# Patient Record
Sex: MALE | Race: WHITE | NOT HISPANIC OR LATINO | Employment: FULL TIME | URBAN - METROPOLITAN AREA
[De-identification: names, ages, dates, MRNs, and addresses within clinical notes are randomized per-mention and may not be internally consistent; named-entity substitution may affect disease eponyms.]

---

## 2023-07-17 LAB — THYROTROPIN (MIU/L) IN SER/PLAS BY DETECTION LIMIT <= 0.05 MIU/L: 0.3 MIU/L (ref 0.44–3.98)

## 2023-08-17 LAB
ANION GAP IN SER/PLAS: 10 MMOL/L (ref 10–20)
CALCIUM (MG/DL) IN SER/PLAS: 8.9 MG/DL (ref 8.6–10.3)
CARBON DIOXIDE, TOTAL (MMOL/L) IN SER/PLAS: 27 MMOL/L (ref 21–32)
CHLORIDE (MMOL/L) IN SER/PLAS: 106 MMOL/L (ref 98–107)
CREATININE (MG/DL) IN SER/PLAS: 0.77 MG/DL (ref 0.5–1.3)
GFR MALE: >90 ML/MIN/1.73M2
GLUCOSE (MG/DL) IN SER/PLAS: 107 MG/DL (ref 74–99)
POTASSIUM (MMOL/L) IN SER/PLAS: 4.1 MMOL/L (ref 3.5–5.3)
SODIUM (MMOL/L) IN SER/PLAS: 139 MMOL/L (ref 136–145)
UREA NITROGEN (MG/DL) IN SER/PLAS: 24 MG/DL (ref 6–23)

## 2024-01-24 ENCOUNTER — LAB (OUTPATIENT)
Dept: LAB | Facility: LAB | Age: 59
End: 2024-01-24
Payer: COMMERCIAL

## 2024-01-24 DIAGNOSIS — E03.9 HYPOTHYROIDISM, UNSPECIFIED: Primary | ICD-10-CM

## 2024-01-24 LAB — TSH SERPL-ACNC: 0.2 MIU/L (ref 0.44–3.98)

## 2024-01-24 PROCEDURE — 84443 ASSAY THYROID STIM HORMONE: CPT

## 2024-01-24 PROCEDURE — 36415 COLL VENOUS BLD VENIPUNCTURE: CPT

## 2024-01-30 ENCOUNTER — OFFICE VISIT (OUTPATIENT)
Dept: ENDOCRINOLOGY | Facility: CLINIC | Age: 59
End: 2024-01-30
Payer: COMMERCIAL

## 2024-01-30 VITALS
SYSTOLIC BLOOD PRESSURE: 155 MMHG | HEART RATE: 79 BPM | DIASTOLIC BLOOD PRESSURE: 88 MMHG | WEIGHT: 232 LBS | BODY MASS INDEX: 33.29 KG/M2

## 2024-01-30 DIAGNOSIS — E03.9 HYPOTHYROIDISM, UNSPECIFIED TYPE: Primary | ICD-10-CM

## 2024-01-30 PROCEDURE — 99213 OFFICE O/P EST LOW 20 MIN: CPT | Performed by: INTERNAL MEDICINE

## 2024-01-30 RX ORDER — LEVOTHYROXINE SODIUM 125 UG/1
TABLET ORAL
COMMUNITY
End: 2024-01-30 | Stop reason: SDUPTHER

## 2024-01-30 RX ORDER — FAMOTIDINE 20 MG/1
20 TABLET, FILM COATED ORAL
COMMUNITY
Start: 2019-07-26

## 2024-01-30 RX ORDER — LEVOTHYROXINE SODIUM 112 UG/1
224 TABLET ORAL
Qty: 180 TABLET | Refills: 3 | Status: SHIPPED | OUTPATIENT
Start: 2024-01-30 | End: 2025-01-29

## 2024-01-30 NOTE — PROGRESS NOTES
History Of Present Illness  Gomez Aponte is a 58 y.o. male with a 29 year history of hypothyroidism    Levothyroxine 250 mcg/day  Patient is taking levothyroxine on an empty stomach with water alone.    No new complaints    History of Whipple procedure 2018  Follow up with Dr. Byrnes     Past Medical History  He has a past medical history of Cyst of pancreas, Impaired fasting glucose (06/10/2020), Impaired fasting glucose (12/10/2019), Personal history of other diseases of the circulatory system (04/16/2019), and Personal history of other specified conditions (04/16/2019).    Surgical History  He has a past surgical history that includes Other surgical history (04/16/2019); Other surgical history (10/27/2022); Other surgical history (07/26/2019); and Other surgical history (04/26/2019).     Social History  He reports that he has an unknown smoking status. He has never been exposed to tobacco smoke. He has never used smokeless tobacco. He reports that he does not drink alcohol and does not use drugs.    Family History  No family history on file.    Medications  Current Outpatient Medications   Medication Instructions    famotidine (PEPCID) 20 mg, oral, Daily RT    levothyroxine (Synthroid, Levoxyl) 125 mcg tablet TAKE TWO (2) TABLETS DAILY ON AN EMPTY STOMACH       Allergies  Patient has no known allergies.    Last Recorded Vitals  Blood pressure 155/88, pulse 79, weight 105 kg (232 lb).    Physical Exam  Constitutional:       General: He is not in acute distress.  Neurological:      Mental Status: He is alert.   Psychiatric:         Mood and Affect: Affect normal.          Relevant Results  Lab Results   Component Value Date    TSH 0.20 (L) 01/24/2024    FREET4 0.76 07/14/2022         IMPRESSION  HYPOTHYROIDISM  Further TSH suppression on LT4 at 250 mcg/day      RECOMMENDATIONS  Decrease Levothyroxine to 224 mcg once daily  Take levothyroxine on an empty stomach with water alone, 1 hour before eating or taking  other medications, 4 hours before any calcium or iron supplement.    Follow up 6 months  Repeat TSH before next appointment    I reminded him to reestablish primary care.

## 2024-01-30 NOTE — PATIENT INSTRUCTIONS
RECOMMENDATIONS  Decrease Levothyroxine to 224 mcg once daily  Take levothyroxine on an empty stomach with water alone, 1 hour before eating or taking other medications, 4 hours before any calcium or iron supplement.    Follow up 6 months  Repeat TSH before next appointment

## 2024-07-25 ENCOUNTER — LAB (OUTPATIENT)
Dept: LAB | Facility: LAB | Age: 59
End: 2024-07-25
Payer: COMMERCIAL

## 2024-07-25 DIAGNOSIS — E03.9 HYPOTHYROIDISM, UNSPECIFIED TYPE: ICD-10-CM

## 2024-07-25 LAB — TSH SERPL-ACNC: 0.73 MIU/L (ref 0.44–3.98)

## 2024-07-25 PROCEDURE — 36415 COLL VENOUS BLD VENIPUNCTURE: CPT

## 2024-07-25 PROCEDURE — 84443 ASSAY THYROID STIM HORMONE: CPT

## 2024-07-31 ENCOUNTER — APPOINTMENT (OUTPATIENT)
Dept: ENDOCRINOLOGY | Facility: CLINIC | Age: 59
End: 2024-07-31
Payer: COMMERCIAL

## 2024-07-31 VITALS
WEIGHT: 225 LBS | HEART RATE: 70 BPM | SYSTOLIC BLOOD PRESSURE: 132 MMHG | BODY MASS INDEX: 32.28 KG/M2 | DIASTOLIC BLOOD PRESSURE: 83 MMHG

## 2024-07-31 DIAGNOSIS — E03.9 HYPOTHYROIDISM, UNSPECIFIED TYPE: ICD-10-CM

## 2024-07-31 PROCEDURE — 99213 OFFICE O/P EST LOW 20 MIN: CPT | Performed by: INTERNAL MEDICINE

## 2024-07-31 RX ORDER — LEVOTHYROXINE SODIUM 112 UG/1
224 TABLET ORAL
Qty: 180 TABLET | Refills: 3 | Status: SHIPPED | OUTPATIENT
Start: 2024-07-31 | End: 2025-07-31

## 2024-07-31 ASSESSMENT — ENCOUNTER SYMPTOMS
PALPITATIONS: 0
TREMORS: 0
NAUSEA: 0
NECK PAIN: 0
WEAKNESS: 0
HEADACHES: 0
NERVOUS/ANXIOUS: 0
SHORTNESS OF BREATH: 0
UNEXPECTED WEIGHT CHANGE: 0
FEVER: 0
TROUBLE SWALLOWING: 0
CONSTIPATION: 0
ABDOMINAL PAIN: 0
VOMITING: 0
DIARRHEA: 0
FATIGUE: 0

## 2024-07-31 NOTE — PROGRESS NOTES
History Of Present Illness  Gomez Aponte is a 58 y.o. male with a 29 year history of hypothyroidism    Levothyroxine decreased 6 months ago from 250 to 224 mcg/day  Patient is taking levothyroxine on an empty stomach with water alone.    Past Medical History  He has a past medical history of Cyst of pancreas ([facility]), Impaired fasting glucose (06/10/2020), Impaired fasting glucose (12/10/2019), Personal history of other diseases of the circulatory system (04/16/2019), and Personal history of other specified conditions (04/16/2019).    Surgical History  He has a past surgical history that includes Other surgical history (04/16/2019); Other surgical history (10/27/2022); Other surgical history (07/26/2019); and Other surgical history (04/26/2019).     Social History  He reports that he has an unknown smoking status. He has never been exposed to tobacco smoke. He has never used smokeless tobacco. He reports that he does not drink alcohol and does not use drugs.    Family History  No family history on file.    Medications  Current Outpatient Medications   Medication Instructions    famotidine (PEPCID) 20 mg, oral, Daily RT    levothyroxine (SYNTHROID, LEVOXYL) 224 mcg, oral, Daily before breakfast       Allergies  Patient has no known allergies.    Review of Systems   Constitutional:  Negative for fatigue, fever and unexpected weight change.   HENT:  Negative for trouble swallowing.    Eyes:  Negative for visual disturbance.        Dry eye   Respiratory:  Negative for shortness of breath.    Cardiovascular:  Negative for chest pain and palpitations.   Gastrointestinal:  Negative for abdominal pain, constipation, diarrhea, nausea and vomiting.   Endocrine: Negative for cold intolerance and heat intolerance.   Musculoskeletal:  Negative for neck pain.   Skin:  Negative for rash.   Neurological:  Negative for tremors, weakness and headaches.   Psychiatric/Behavioral:  The patient is not nervous/anxious.          Last  Recorded Vitals  Blood pressure 132/83, pulse 70, weight 102 kg (225 lb).    Physical Exam  Constitutional:       General: He is not in acute distress.  Neurological:      Mental Status: He is alert.   Psychiatric:         Mood and Affect: Affect normal.          Relevant Results  Lab Results   Component Value Date    TSH 0.73 07/25/2024    FREET4 0.76 07/14/2022         IMPRESSION  HYPOTHYROIDISM  TSH euthyroid on decreased Levothyroxine dose      RECOMMENDATIONS  Continue levothyroxine 224 mcg/day  Take levothyroxine on an empty stomach with water alone, 1 hour before eating or taking other medications, 4 hours before any calcium or iron supplement.    Follow up 1 year  Repeat TSH before next appointment

## 2024-07-31 NOTE — PATIENT INSTRUCTIONS
RECOMMENDATIONS  Continue levothyroxine 224 mcg/day  Take levothyroxine on an empty stomach with water alone, 1 hour before eating or taking other medications, 4 hours before any calcium or iron supplement.    Follow up 1 year  Repeat TSH before next appointment

## 2024-08-19 DIAGNOSIS — D49.0 IPMN (INTRADUCTAL PAPILLARY MUCINOUS NEOPLASM): Primary | ICD-10-CM

## 2024-08-21 ENCOUNTER — APPOINTMENT (OUTPATIENT)
Dept: PRIMARY CARE | Facility: CLINIC | Age: 59
End: 2024-08-21
Payer: COMMERCIAL

## 2024-08-21 VITALS
HEIGHT: 70 IN | WEIGHT: 223 LBS | BODY MASS INDEX: 31.92 KG/M2 | SYSTOLIC BLOOD PRESSURE: 120 MMHG | HEART RATE: 72 BPM | DIASTOLIC BLOOD PRESSURE: 80 MMHG

## 2024-08-21 DIAGNOSIS — E11.9 DIET-CONTROLLED DIABETES MELLITUS (MULTI): ICD-10-CM

## 2024-08-21 DIAGNOSIS — Z12.11 COLON CANCER SCREENING: Primary | ICD-10-CM

## 2024-08-21 DIAGNOSIS — D49.0 IPMN (INTRADUCTAL PAPILLARY MUCINOUS NEOPLASM): ICD-10-CM

## 2024-08-21 DIAGNOSIS — Z12.5 PROSTATE CANCER SCREENING: ICD-10-CM

## 2024-08-21 DIAGNOSIS — E03.9 ACQUIRED HYPOTHYROIDISM: ICD-10-CM

## 2024-08-21 DIAGNOSIS — K21.00 GASTROESOPHAGEAL REFLUX DISEASE WITH ESOPHAGITIS WITHOUT HEMORRHAGE: ICD-10-CM

## 2024-08-21 DIAGNOSIS — Z76.89 ENCOUNTER TO ESTABLISH CARE: ICD-10-CM

## 2024-08-21 PROBLEM — K21.9 GERD (GASTROESOPHAGEAL REFLUX DISEASE): Status: ACTIVE | Noted: 2023-08-23

## 2024-08-21 PROCEDURE — 3079F DIAST BP 80-89 MM HG: CPT | Performed by: CLINICAL NURSE SPECIALIST

## 2024-08-21 PROCEDURE — 99214 OFFICE O/P EST MOD 30 MIN: CPT | Performed by: CLINICAL NURSE SPECIALIST

## 2024-08-21 PROCEDURE — 3008F BODY MASS INDEX DOCD: CPT | Performed by: CLINICAL NURSE SPECIALIST

## 2024-08-21 PROCEDURE — 3074F SYST BP LT 130 MM HG: CPT | Performed by: CLINICAL NURSE SPECIALIST

## 2024-08-21 RX ORDER — FAMOTIDINE 20 MG/1
20 TABLET, FILM COATED ORAL DAILY
Qty: 90 TABLET | Refills: 3 | Status: SHIPPED | OUTPATIENT
Start: 2024-08-21 | End: 2025-08-16

## 2024-08-21 ASSESSMENT — COLUMBIA-SUICIDE SEVERITY RATING SCALE - C-SSRS
1. IN THE PAST MONTH, HAVE YOU WISHED YOU WERE DEAD OR WISHED YOU COULD GO TO SLEEP AND NOT WAKE UP?: NO
2. HAVE YOU ACTUALLY HAD ANY THOUGHTS OF KILLING YOURSELF?: NO
6. HAVE YOU EVER DONE ANYTHING, STARTED TO DO ANYTHING, OR PREPARED TO DO ANYTHING TO END YOUR LIFE?: NO

## 2024-08-21 ASSESSMENT — ENCOUNTER SYMPTOMS
SORE THROAT: 0
OCCASIONAL FEELINGS OF UNSTEADINESS: 0
SEIZURES: 0
MYALGIAS: 0
ACTIVITY CHANGE: 0
SHORTNESS OF BREATH: 0
BLOOD IN STOOL: 0
ABDOMINAL PAIN: 0
WHEEZING: 0
DYSURIA: 0
APPETITE CHANGE: 0
BRUISES/BLEEDS EASILY: 0
UNEXPECTED WEIGHT CHANGE: 0
CHEST TIGHTNESS: 0
HEMATURIA: 0
PALPITATIONS: 0
DEPRESSION: 0
CHILLS: 0
HEADACHES: 0
EYE PAIN: 0
TROUBLE SWALLOWING: 0
JOINT SWELLING: 0
WOUND: 0
FLANK PAIN: 0
SLEEP DISTURBANCE: 0
FATIGUE: 0
VOMITING: 0
POLYDIPSIA: 0
DIZZINESS: 0
BACK PAIN: 0
DIARRHEA: 0
PHOTOPHOBIA: 0
ARTHRALGIAS: 0
COUGH: 0
NAUSEA: 0
CONFUSION: 0
FEVER: 0
LOSS OF SENSATION IN FEET: 0
NECK PAIN: 0
CONSTIPATION: 0

## 2024-08-21 ASSESSMENT — PATIENT HEALTH QUESTIONNAIRE - PHQ9
SUM OF ALL RESPONSES TO PHQ9 QUESTIONS 1 AND 2: 0
1. LITTLE INTEREST OR PLEASURE IN DOING THINGS: NOT AT ALL
2. FEELING DOWN, DEPRESSED OR HOPELESS: NOT AT ALL

## 2024-08-21 NOTE — PROGRESS NOTES
Subjective   Patient ID: Gomez Aponte is a 59 y.o. male who presents for Establish Care (Transfer from Dr. Alvarez/Discuss varicose veins ).  HPI    New patient here today to establish care.  Transfer care from Dr. Alvarez. Last visit with Dr. Alvarez July 2022.     Follows with Dr. Sewell for Endo for Hypothyroidism. Annually.     Follows with Dr. Byrnes. 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 Imaging to be done in September.     Up to date with [facility] and Dental exams.    Review of Systems   Constitutional:  Negative for activity change, appetite change, chills, fatigue, fever and unexpected weight change.   HENT:  Negative for ear pain, hearing loss, nosebleeds, sore throat, tinnitus and trouble swallowing.    Eyes:  Negative for photophobia, pain and visual disturbance.   Respiratory:  Negative for cough, chest tightness, shortness of breath and wheezing.    Cardiovascular:  Negative for chest pain, palpitations and leg swelling.   Gastrointestinal:  Negative for abdominal pain, blood in stool, constipation, diarrhea, nausea and vomiting.   Endocrine: Negative for cold intolerance, heat intolerance, polydipsia and polyuria.   Genitourinary:  Negative for dysuria, flank pain and hematuria.   Musculoskeletal:  Negative for arthralgias, back pain, joint swelling, myalgias and neck pain.   Skin:  Negative for pallor, rash and wound.   Allergic/Immunologic: Negative for immunocompromised state.   Neurological:  Negative for dizziness, seizures and headaches.   Hematological:  Does not bruise/bleed easily.   Psychiatric/Behavioral:  Negative for confusion and sleep disturbance.        Objective   Physical Exam  Vitals and nursing note reviewed.   Constitutional:       General: He is not in acute distress.     Appearance: Normal appearance.   HENT:      Head: Normocephalic.      Nose: Nose normal.   Eyes:      Conjunctiva/sclera: Conjunctivae normal.   Neck:      Vascular: No carotid  bruit.   Cardiovascular:      Rate and Rhythm: Normal rate and regular rhythm.      Pulses: Normal pulses.      Heart sounds: Normal heart sounds.   Pulmonary:      Effort: Pulmonary effort is normal.      Breath sounds: Normal breath sounds.   Abdominal:      General: Bowel sounds are normal.      Palpations: Abdomen is soft.   Musculoskeletal:         General: Normal range of motion.      Cervical back: Normal range of motion.   Skin:     General: Skin is warm and dry.   Neurological:      Mental Status: He is alert and oriented to person, place, and time. Mental status is at baseline.   Psychiatric:         Mood and Affect: Mood normal.         Behavior: Behavior normal.       Assessment/Plan       New order for lab work provided at  today.     Diabetes: Most recent A1C 6.4%. Diet controlled. Albumin ordered. Up to date with vision exams. Normally well controlled readings at home, around 118 per patient. CT Cardiac Scoring ordered.   GERD: Famotidine as prescribed.   Hypothyroidism: Levothyroxine as prescribed. Continue to follow with Endo as previously determined.   IPMN: Following with Dr. Byrnes as previously determined.     COVID Vaccinations: June 2021.   Declined updated Vaccinations.   Colon Cancer Screening: Cologuard ordered.   Prostate Cancer Screening: Ordered.       ABIMAEL Martin-CNS 08/21/24 2:11 PM

## 2024-08-24 ENCOUNTER — LAB (OUTPATIENT)
Dept: LAB | Facility: LAB | Age: 59
End: 2024-08-24
Payer: COMMERCIAL

## 2024-08-24 DIAGNOSIS — K21.00 GASTROESOPHAGEAL REFLUX DISEASE WITH ESOPHAGITIS WITHOUT HEMORRHAGE: ICD-10-CM

## 2024-08-24 DIAGNOSIS — E11.9 DIET-CONTROLLED DIABETES MELLITUS (MULTI): ICD-10-CM

## 2024-08-24 DIAGNOSIS — Z12.11 COLON CANCER SCREENING: ICD-10-CM

## 2024-08-24 DIAGNOSIS — Z12.5 PROSTATE CANCER SCREENING: ICD-10-CM

## 2024-08-24 LAB
ALBUMIN SERPL BCP-MCNC: 4.3 G/DL (ref 3.4–5)
ALP SERPL-CCNC: 71 U/L (ref 33–120)
ALT SERPL W P-5'-P-CCNC: 17 U/L (ref 10–52)
ANION GAP SERPL CALC-SCNC: 11 MMOL/L (ref 10–20)
AST SERPL W P-5'-P-CCNC: 19 U/L (ref 9–39)
BILIRUB SERPL-MCNC: 0.9 MG/DL (ref 0–1.2)
BUN SERPL-MCNC: 18 MG/DL (ref 6–23)
CALCIUM SERPL-MCNC: 9 MG/DL (ref 8.6–10.3)
CHLORIDE SERPL-SCNC: 106 MMOL/L (ref 98–107)
CHOLEST SERPL-MCNC: 190 MG/DL (ref 0–199)
CHOLESTEROL/HDL RATIO: 4.3
CO2 SERPL-SCNC: 26 MMOL/L (ref 21–32)
CREAT SERPL-MCNC: 1.01 MG/DL (ref 0.5–1.3)
EGFRCR SERPLBLD CKD-EPI 2021: 86 ML/MIN/1.73M*2
ERYTHROCYTE [DISTWIDTH] IN BLOOD BY AUTOMATED COUNT: 16.2 % (ref 11.5–14.5)
EST. AVERAGE GLUCOSE BLD GHB EST-MCNC: 143 MG/DL
GLUCOSE SERPL-MCNC: 116 MG/DL (ref 74–99)
HBA1C MFR BLD: 6.6 %
HCT VFR BLD AUTO: 41.6 % (ref 41–52)
HCV AB SER QL: NONREACTIVE
HDLC SERPL-MCNC: 44.4 MG/DL
HGB BLD-MCNC: 12.8 G/DL (ref 13.5–17.5)
LDLC SERPL CALC-MCNC: 134 MG/DL
MCH RBC QN AUTO: 23.7 PG (ref 26–34)
MCHC RBC AUTO-ENTMCNC: 30.8 G/DL (ref 32–36)
MCV RBC AUTO: 77 FL (ref 80–100)
NON HDL CHOLESTEROL: 146 MG/DL (ref 0–149)
NRBC BLD-RTO: 0 /100 WBCS (ref 0–0)
PLATELET # BLD AUTO: 150 X10*3/UL (ref 150–450)
POTASSIUM SERPL-SCNC: 4.2 MMOL/L (ref 3.5–5.3)
PROT SERPL-MCNC: 6.7 G/DL (ref 6.4–8.2)
PSA SERPL-MCNC: 0.96 NG/ML
RBC # BLD AUTO: 5.41 X10*6/UL (ref 4.5–5.9)
SODIUM SERPL-SCNC: 139 MMOL/L (ref 136–145)
TRIGL SERPL-MCNC: 58 MG/DL (ref 0–149)
VIT B12 SERPL-MCNC: 408 PG/ML (ref 211–911)
VLDL: 12 MG/DL (ref 0–40)
WBC # BLD AUTO: 4.4 X10*3/UL (ref 4.4–11.3)

## 2024-08-24 PROCEDURE — 84153 ASSAY OF PSA TOTAL: CPT

## 2024-08-24 PROCEDURE — 82607 VITAMIN B-12: CPT

## 2024-08-24 PROCEDURE — 80053 COMPREHEN METABOLIC PANEL: CPT

## 2024-08-24 PROCEDURE — 85027 COMPLETE CBC AUTOMATED: CPT

## 2024-08-24 PROCEDURE — 80061 LIPID PANEL: CPT

## 2024-08-24 PROCEDURE — 36415 COLL VENOUS BLD VENIPUNCTURE: CPT

## 2024-08-24 PROCEDURE — 86803 HEPATITIS C AB TEST: CPT

## 2024-08-24 PROCEDURE — 83036 HEMOGLOBIN GLYCOSYLATED A1C: CPT

## 2024-08-26 ENCOUNTER — TELEPHONE (OUTPATIENT)
Dept: PRIMARY CARE | Facility: CLINIC | Age: 59
End: 2024-08-26
Payer: COMMERCIAL

## 2024-08-26 DIAGNOSIS — Z12.5 PROSTATE CANCER SCREENING: Primary | ICD-10-CM

## 2024-08-26 DIAGNOSIS — Z00.00 HEALTHCARE MAINTENANCE: ICD-10-CM

## 2024-08-26 NOTE — TELEPHONE ENCOUNTER
----- Message from Emy Bellamy sent at 8/26/2024  7:30 AM EDT -----  Updated patient via My Chart of results. Please reorder lab work for patient to have done prior to follow up appointment. Thank you!

## 2024-08-27 ENCOUNTER — LAB (OUTPATIENT)
Dept: LAB | Facility: LAB | Age: 59
End: 2024-08-27
Payer: COMMERCIAL

## 2024-08-27 DIAGNOSIS — K21.00 GASTROESOPHAGEAL REFLUX DISEASE WITH ESOPHAGITIS WITHOUT HEMORRHAGE: ICD-10-CM

## 2024-08-27 DIAGNOSIS — Z12.11 COLON CANCER SCREENING: ICD-10-CM

## 2024-08-27 DIAGNOSIS — E11.9 DIET-CONTROLLED DIABETES MELLITUS (MULTI): ICD-10-CM

## 2024-08-27 LAB
CREAT UR-MCNC: 108.3 MG/DL (ref 20–370)
MICROALBUMIN UR-MCNC: <7 MG/L
MICROALBUMIN/CREAT UR: NORMAL MG/G{CREAT}

## 2024-08-27 PROCEDURE — 82570 ASSAY OF URINE CREATININE: CPT

## 2024-08-27 PROCEDURE — 82043 UR ALBUMIN QUANTITATIVE: CPT

## 2024-09-04 ENCOUNTER — APPOINTMENT (OUTPATIENT)
Dept: RADIOLOGY | Facility: HOSPITAL | Age: 59
End: 2024-09-04
Payer: COMMERCIAL

## 2024-09-04 DIAGNOSIS — R19.5 POSITIVE COLORECTAL CANCER SCREENING USING COLOGUARD TEST: Primary | ICD-10-CM

## 2024-09-04 LAB — NONINV COLON CA DNA+OCC BLD SCRN STL QL: POSITIVE

## 2024-09-06 ENCOUNTER — APPOINTMENT (OUTPATIENT)
Dept: RADIOLOGY | Facility: HOSPITAL | Age: 59
End: 2024-09-06
Payer: COMMERCIAL

## 2024-09-10 ENCOUNTER — TELEPHONE (OUTPATIENT)
Dept: GASTROENTEROLOGY | Facility: CLINIC | Age: 59
End: 2024-09-10
Payer: COMMERCIAL

## 2024-09-10 NOTE — TELEPHONE ENCOUNTER
----- Message from Marco HARPER sent at 9/10/2024  1:31 PM EDT -----  Regarding: RE: Open access  Patient scheduled with Dr. Mir for 9/30/24  ----- Message -----  From: Kaleigh Go MA  Sent: 9/10/2024  10:20 AM EDT  To: [id number]  Subject: RE: Open access                                  Left message on machine to return call  ----- Message -----  From: Montserrat Rodriguez RN  Sent: 9/6/2024   1:33 PM EDT  To: [id number]  Subject: Open access                                      Open access. OK to OA per Randy

## 2024-09-13 ENCOUNTER — APPOINTMENT (OUTPATIENT)
Dept: SURGERY | Facility: CLINIC | Age: 59
End: 2024-09-13
Payer: COMMERCIAL

## 2024-09-20 ENCOUNTER — HOSPITAL ENCOUNTER (OUTPATIENT)
Dept: RADIOLOGY | Facility: HOSPITAL | Age: 59
Discharge: HOME | End: 2024-09-20
Payer: COMMERCIAL

## 2024-09-20 DIAGNOSIS — D49.0 IPMN (INTRADUCTAL PAPILLARY MUCINOUS NEOPLASM): ICD-10-CM

## 2024-09-20 PROCEDURE — 2550000001 HC RX 255 CONTRASTS: Performed by: SURGERY

## 2024-09-20 PROCEDURE — A9575 INJ GADOTERATE MEGLUMI 0.1ML: HCPCS | Performed by: SURGERY

## 2024-09-20 PROCEDURE — 74183 MRI ABD W/O CNTR FLWD CNTR: CPT

## 2024-09-20 RX ORDER — GADOTERATE MEGLUMINE 376.9 MG/ML
0.2 INJECTION INTRAVENOUS
Status: COMPLETED | OUTPATIENT
Start: 2024-09-20 | End: 2024-09-20

## 2024-09-24 NOTE — PROGRESS NOTES
"Reason for visit:  AUV / Review Imaging  DRAGON DOWN NOTES ABRIDGED    HPI:  Recall that in May 2019 I did a Whipple for a main duct IPMN with moderate dysplasia. He has been undergoing annual surveillance since.   Summary from visit last yeart:    \"This gentleman is doing extraordinarily well 4 years out from his Whipple for main duct IPMN with moderate grade dysplasia. Given his stable MRI we plan another MRI in a years time. If that is stable we will transition to every other year. He will call my office in a years time and my team will set up his MRI and office visit.     -----    MR Sept 2024:  FINDINGS:  Pancreas: Post Whipple status. The remnant pancreas appears mildly  atrophic and grossly unchanged when compared to 08/23/2023. The main  duct measures up to 5 mm in the body (coronal T2 series 3, image 22),  not significantly changed. There is mild prominence of a few side  branches. No new or progressive duct dilation. No discrete  obstructing mass.  IMPRESSION:  Stable appearance of remnant pancreas with similar degree of main  duct dilation (5 mm) when compared to 08/23/2023.    STABLE 5MM DUCT TO 2022    NO NEW HEALTH ISSUES    PE:  LOOKS GREAT NO HERNIA    Impression / Plan:    STABLE MRI 5 YEARS POST WHPPLE FOR NONIVASIVE IPMN.  STABLE 5MM PD.     WILL TRANSISITION TO EVERY OTHER YEAR MRI.  PT WILL LMK IF ANY IMAGING DONE FOR ANY OTHER REASON.  ALSO WILL LET ME KNOW IF ANY OTHER CONCERNING ISSUES LIKE WT LOSS, DM, ETC    "

## 2024-09-27 ENCOUNTER — PREP FOR PROCEDURE (OUTPATIENT)
Dept: GASTROENTEROLOGY | Facility: CLINIC | Age: 59
End: 2024-09-27

## 2024-09-27 ENCOUNTER — APPOINTMENT (OUTPATIENT)
Dept: SURGERY | Facility: CLINIC | Age: 59
End: 2024-09-27
Payer: COMMERCIAL

## 2024-09-27 VITALS
WEIGHT: 223 LBS | SYSTOLIC BLOOD PRESSURE: 146 MMHG | DIASTOLIC BLOOD PRESSURE: 90 MMHG | OXYGEN SATURATION: 100 % | HEART RATE: 64 BPM | BODY MASS INDEX: 31.92 KG/M2 | TEMPERATURE: 97.3 F | HEIGHT: 70 IN

## 2024-09-27 DIAGNOSIS — D49.0 IPMN (INTRADUCTAL PAPILLARY MUCINOUS NEOPLASM): Primary | ICD-10-CM

## 2024-09-27 PROCEDURE — 99212 OFFICE O/P EST SF 10 MIN: CPT | Performed by: SURGERY

## 2024-09-27 PROCEDURE — 3008F BODY MASS INDEX DOCD: CPT | Performed by: SURGERY

## 2024-09-27 RX ORDER — SODIUM CHLORIDE 9 MG/ML
20 INJECTION, SOLUTION INTRAVENOUS CONTINUOUS
Status: CANCELLED | OUTPATIENT
Start: 2024-09-27

## 2024-09-30 ENCOUNTER — ANESTHESIA (OUTPATIENT)
Dept: GASTROENTEROLOGY | Facility: HOSPITAL | Age: 59
End: 2024-09-30
Payer: COMMERCIAL

## 2024-09-30 ENCOUNTER — HOSPITAL ENCOUNTER (OUTPATIENT)
Dept: GASTROENTEROLOGY | Facility: HOSPITAL | Age: 59
Discharge: HOME | End: 2024-09-30
Payer: COMMERCIAL

## 2024-09-30 ENCOUNTER — ANESTHESIA EVENT (OUTPATIENT)
Dept: GASTROENTEROLOGY | Facility: HOSPITAL | Age: 59
End: 2024-09-30
Payer: COMMERCIAL

## 2024-09-30 VITALS
BODY MASS INDEX: 31.92 KG/M2 | WEIGHT: 223 LBS | SYSTOLIC BLOOD PRESSURE: 127 MMHG | TEMPERATURE: 97.5 F | RESPIRATION RATE: 19 BRPM | OXYGEN SATURATION: 97 % | DIASTOLIC BLOOD PRESSURE: 81 MMHG | HEIGHT: 70 IN | HEART RATE: 60 BPM

## 2024-09-30 DIAGNOSIS — R19.5 POSITIVE COLORECTAL CANCER SCREENING USING COLOGUARD TEST: ICD-10-CM

## 2024-09-30 PROCEDURE — 2500000005 HC RX 250 GENERAL PHARMACY W/O HCPCS: Performed by: NURSE ANESTHETIST, CERTIFIED REGISTERED

## 2024-09-30 PROCEDURE — 3700000001 HC GENERAL ANESTHESIA TIME - INITIAL BASE CHARGE

## 2024-09-30 PROCEDURE — 45380 COLONOSCOPY AND BIOPSY: CPT | Performed by: INTERNAL MEDICINE

## 2024-09-30 PROCEDURE — 2500000004 HC RX 250 GENERAL PHARMACY W/ HCPCS (ALT 636 FOR OP/ED): Performed by: NURSE ANESTHETIST, CERTIFIED REGISTERED

## 2024-09-30 PROCEDURE — 3700000002 HC GENERAL ANESTHESIA TIME - EACH INCREMENTAL 1 MINUTE

## 2024-09-30 PROCEDURE — 7100000010 HC PHASE TWO TIME - EACH INCREMENTAL 1 MINUTE

## 2024-09-30 PROCEDURE — 2500000004 HC RX 250 GENERAL PHARMACY W/ HCPCS (ALT 636 FOR OP/ED): Performed by: INTERNAL MEDICINE

## 2024-09-30 PROCEDURE — 7100000009 HC PHASE TWO TIME - INITIAL BASE CHARGE

## 2024-09-30 RX ORDER — SODIUM CHLORIDE 9 MG/ML
20 INJECTION, SOLUTION INTRAVENOUS CONTINUOUS
Status: DISCONTINUED | OUTPATIENT
Start: 2024-09-30 | End: 2024-10-01 | Stop reason: HOSPADM

## 2024-09-30 RX ORDER — LIDOCAINE HCL/PF 100 MG/5ML
SYRINGE (ML) INTRAVENOUS AS NEEDED
Status: DISCONTINUED | OUTPATIENT
Start: 2024-09-30 | End: 2024-09-30

## 2024-09-30 RX ORDER — PROPOFOL 10 MG/ML
INJECTION, EMULSION INTRAVENOUS AS NEEDED
Status: DISCONTINUED | OUTPATIENT
Start: 2024-09-30 | End: 2024-09-30

## 2024-09-30 SDOH — HEALTH STABILITY: MENTAL HEALTH: CURRENT SMOKER: 0

## 2024-09-30 ASSESSMENT — PAIN SCALES - GENERAL
PAINLEVEL_OUTOF10: 0 - NO PAIN

## 2024-09-30 ASSESSMENT — COLUMBIA-SUICIDE SEVERITY RATING SCALE - C-SSRS
6. HAVE YOU EVER DONE ANYTHING, STARTED TO DO ANYTHING, OR PREPARED TO DO ANYTHING TO END YOUR LIFE?: NO
1. IN THE PAST MONTH, HAVE YOU WISHED YOU WERE DEAD OR WISHED YOU COULD GO TO SLEEP AND NOT WAKE UP?: NO
2. HAVE YOU ACTUALLY HAD ANY THOUGHTS OF KILLING YOURSELF?: NO

## 2024-09-30 ASSESSMENT — PAIN - FUNCTIONAL ASSESSMENT
PAIN_FUNCTIONAL_ASSESSMENT: 0-10

## 2024-09-30 NOTE — ANESTHESIA POSTPROCEDURE EVALUATION
Patient: Gomez Aponte    Procedure Summary       Date: 09/30/24 Room / Location: [facility]    Anesthesia Start: 1245 Anesthesia Stop: 1312    Procedure: COLONOSCOPY Diagnosis: Positive colorectal cancer screening using Cologuard test    Scheduled Providers: Dave Mir MD Responsible Provider: MICHELE Rivera    Anesthesia Type: MAC ASA Status: 2            Anesthesia Type: MAC    Vitals Value Taken Time   /81 09/30/24 1330   Temp 36.4 °C (97.5 °F) 09/30/24 1330   Pulse 60 09/30/24 1330   Resp 19 09/30/24 1330   SpO2 97 % 09/30/24 1330       Anesthesia Post Evaluation    Patient location during evaluation: bedside  Patient participation: complete - patient participated  Level of consciousness: awake  Pain management: adequate  Airway patency: patent  Cardiovascular status: acceptable  Respiratory status: acceptable  Hydration status: acceptable  Postoperative Nausea and Vomiting: none    There were no known notable events for this encounter.

## 2024-09-30 NOTE — ANESTHESIA PREPROCEDURE EVALUATION
Patient: Gomez Aponte    Procedure Information       Date/Time: 09/30/24 1300    Scheduled providers: Dave Mir MD    Procedure: COLONOSCOPY    Location:  [facility]            Relevant Problems   Anesthesia (within normal limits)      GI   (+) GERD (gastroesophageal reflux disease)      Endocrine   (+) Hypothyroidism       Clinical information reviewed:   Tobacco  Allergies  Meds   Med Hx  Surg Hx   Fam Hx  Soc Hx        NPO Detail:  NPO/Void Status  Date of Last Liquid: 09/30/24  Time of Last Liquid: 0800  Date of Last Solid: 09/28/24  Time of Last Solid: 2000  Last Intake Type: Solid meal; GI prep  Time of Last Void: 1030         Physical Exam    Airway  Mallampati: III     Cardiovascular - normal exam     Dental    Pulmonary - normal exam     Abdominal            Anesthesia Plan    History of general anesthesia?: yes  History of complications of general anesthesia?: no    ASA 2     MAC     The patient is not a current smoker.    Anesthetic plan and risks discussed with patient.  Use of blood products discussed with who consented to blood products.

## 2024-09-30 NOTE — H&P
Pre-sedation Evaluation:  Sedation Necessary For: Analgesia  Sedation to be Managed By: Anesthesia (Monitored Anesthesia Care/MAC)    History of Present Illness and Indication for Procedure      Gomez Aponte is a 59 y.o. male with a history of HTN, IPMN s/p Whipple, hypothyroidism, HLD, and GERD who presents for OPEN ACCESS colonoscopy requested by his PCP to evaluate a positive Cologuard.    He reports that he had a colonoscopy around 5 years ago that was normal. There is no family history of colorectal cancer.        NPO guidelines met: Yes         Review of Systems  Constitutional:  Negative for chills, fever and unexpected weight change.   HENT:  Negative for trouble swallowing.    Respiratory:  Negative for shortness of breath.    Cardiovascular:  Negative for chest pain.   Gastrointestinal:  As above.   Skin:  Negative for color change.       I performed a complete 10 point review of systems and it is negative except as noted in HPI or above. All other systems have been reviewed and are negative.      Patient Active Problem List   Diagnosis    GERD (gastroesophageal reflux disease)    Hypothyroidism    IPMN (intraductal papillary mucinous neoplasm)       Past Medical History:  He has a past medical history of Cyst of pancreas ([facility]), Impaired fasting glucose (06/10/2020), Impaired fasting glucose (12/10/2019), Personal history of other diseases of the circulatory system (04/16/2019), and Personal history of other specified conditions (04/16/2019).    Past Surgical History:  He has a past surgical history that includes Other surgical history (04/16/2019); Other surgical history (10/27/2022); Other surgical history (07/26/2019); and Other surgical history (04/26/2019).      Social History:  He reports that he has an unknown smoking status. He has never been exposed to tobacco smoke. He has never used smokeless tobacco. He reports that he does not drink alcohol and does not use drugs.    Family  History:  Family History   Problem Relation Name Age of Onset    Liver cancer Mother      Stomach cancer Father          Allergies:  Patient has no known allergies.    Current Medications  Current Outpatient Medications on File Prior to Encounter   Medication Sig Dispense Refill    famotidine (Pepcid) 20 mg tablet Take 1 tablet (20 mg) by mouth once daily. 90 tablet 3    levothyroxine (Synthroid, Levoxyl) 112 mcg tablet Take 2 tablets (224 mcg) by mouth once daily in the morning. Take before meals. 180 tablet 3     No current facility-administered medications on file prior to encounter.         Last Recorded Vitals  There were no vitals taken for this visit.      Physical Exam  Vitals reviewed.   Constitutional:       General: He is not in acute distress.     Appearance: He is not ill-appearing.   HENT:      Head: Normocephalic and atraumatic.      Mouth/Throat:      Comments: Mallampati: II  Cardiovascular:      Rate and Rhythm: Normal rate and regular rhythm.      Pulses: Normal pulses.      Heart sounds: Normal heart sounds. No murmur heard.  Pulmonary:      Effort: Pulmonary effort is normal. No respiratory distress.   Abdominal:      General: Bowel sounds are normal.      Palpations: Abdomen is soft.      Tenderness: There is no abdominal tenderness.   Skin:     General: Skin is warm and dry.   Neurological:      General: No focal deficit present.      Mental Status: He is alert and oriented to person, place, and time.              Assessment/Plan     Colonoscopy in endo with MAC sedation, ASA 2        Level of Sedation: Moderate Sedation  (Sedation medications to be delivered via monitored anesthesia care (MAC).     This evaluation serves as my H&P.     Outpatient medication list and allergies have been reviewed.  Pre-procedure/rocio procedure antibiotics not needed.     Pre-procedure evaluation completed by physician.           Dave Mir MD

## 2024-10-01 NOTE — ADDENDUM NOTE
Encounter addended by: Jyothi Jean-Baptiste RN on: 10/1/2024 1:14 PM   Actions taken: Contacts section saved, Flowsheet accepted

## 2024-10-08 LAB
LABORATORY COMMENT REPORT: NORMAL
PATH REPORT.FINAL DX SPEC: NORMAL
PATH REPORT.GROSS SPEC: NORMAL
PATH REPORT.RELEVANT HX SPEC: NORMAL
PATH REPORT.TOTAL CANCER: NORMAL

## 2024-11-22 ENCOUNTER — OFFICE VISIT (OUTPATIENT)
Dept: URGENT CARE | Age: 59
End: 2024-11-22
Payer: COMMERCIAL

## 2024-11-22 VITALS
TEMPERATURE: 98.6 F | HEART RATE: 54 BPM | SYSTOLIC BLOOD PRESSURE: 158 MMHG | OXYGEN SATURATION: 98 % | DIASTOLIC BLOOD PRESSURE: 95 MMHG

## 2024-11-22 DIAGNOSIS — S61.411A LACERATION OF RIGHT HAND WITHOUT FOREIGN BODY, INITIAL ENCOUNTER: Primary | ICD-10-CM

## 2024-11-22 ASSESSMENT — ENCOUNTER SYMPTOMS
MYALGIAS: 0
WOUND: 1
JOINT SWELLING: 0
NUMBNESS: 0

## 2024-11-22 NOTE — PATIENT INSTRUCTIONS
Laceration Care:     Keep area clean with soap and water and dry, keep open or cover with simple bandaid if needed    Monitor for redness, swelling, discharge, if signs of infection return to clinic    Return to clinic in 7-10 day for suture removal    Er if any numbness, tingling, joint pain, fever.

## 2024-11-22 NOTE — PROGRESS NOTES
Subjective   Patient ID: Gomez Aponte is a 59 y.o. male. They present today with a chief complaint of Laceration (Finger laceration at work 112-22-24 7:30 am/).    History of Present Illness  Patient presents for laceration to right hand sustained at 7 am this morning at work. Patient is up to date on tetanus within the last 2 years. Explains that he was pulling on a metal grate and cut his palm beneath his thumb. Denies numbness, tingling. Denies any other associated injury including fall or head injury. Denies limitation to ROM. Denies FB.           Past Medical History  Allergies as of 11/22/2024    (No Known Allergies)       (Not in a hospital admission)       Past Medical History:   Diagnosis Date    Cyst of pancreas (HHS-HCC)     Pancreatic cyst    GERD (gastroesophageal reflux disease)     Hypothyroidism     Impaired fasting glucose 06/10/2020    Abnormal fasting glucose    Impaired fasting glucose 12/10/2019    Abnormal fasting glucose    Personal history of other diseases of the circulatory system 04/16/2019    History of hypertension    Personal history of other specified conditions 04/16/2019    History of abdominal pain       Past Surgical History:   Procedure Laterality Date    OTHER SURGICAL HISTORY  04/16/2019    Carpal tunnel surgery    OTHER SURGICAL HISTORY  10/27/2022    Gallbladder surgery    OTHER SURGICAL HISTORY  07/26/2019    Whipple procedure    OTHER SURGICAL HISTORY  04/26/2019    Eye surgery        reports that he has an unknown smoking status. He has never been exposed to tobacco smoke. He has never used smokeless tobacco. He reports that he does not drink alcohol and does not use drugs.    Review of Systems  Review of Systems   Musculoskeletal:  Negative for joint swelling and myalgias.   Skin:  Positive for wound.   Neurological:  Negative for numbness.                                  Objective    Vitals:    11/22/24 0947   BP: (!) 158/95   Pulse: 54   Temp: 37 °C (98.6 °F)   SpO2:  98%     No LMP for male patient.    Physical Exam  Musculoskeletal:      Right wrist: Laceration present. No swelling or tenderness. Normal range of motion. Normal pulse.      Right hand: Normal capillary refill. Normal pulse.   Skin:     Findings: Laceration present.      Comments: 2-3 cm linear laceration to thenar eminence of right palm. No fb on inspection.          Laceration Repair    Date/Time: 11/22/2024 10:54 AM    Performed by: Nina Garcia PA-C  Authorized by: Nina Garcia PA-C    Consent:     Consent obtained:  Verbal    Risks discussed:  Pain and infection  Universal protocol:     Patient identity confirmed:  Verbally with patient  Anesthesia:     Anesthesia method:  Local infiltration    Local anesthetic:  Lidocaine 1% w/o epi  Laceration details:     Location:  Hand    Hand location:  R palm    Length (cm):  2.5    Depth (mm):  1  Exploration:     Imaging outcome: foreign body not noted    Treatment:     Area cleansed with:  Povidone-iodine and saline    Amount of cleaning:  Standard    Irrigation solution:  Sterile saline    Irrigation method:  Syringe    Debridement:  None  Skin repair:     Repair method:  Sutures    Suture size:  5-0    Suture material:  Nylon    Suture technique:  Simple interrupted    Number of sutures:  5  Approximation:     Approximation:  Close  Repair type:     Repair type:  Simple  Post-procedure details:     Dressing:  Adhesive bandage    Procedure completion:  Tolerated well, no immediate complications      Point of Care Test & Imaging Results from this visit  No results found for this visit on 11/22/24.   No results found.    Diagnostic study results (if any) were reviewed by Nina Garcia PA-C.    Assessment/Plan   Allergies, medications, history, and pertinent labs/EKGs/Imaging reviewed by Nina Garcia PA-C.     Medical Decision Making  [facility].  Laceration in office able to be closed with skin sutures - No evidence of retained FB as well  as any vascular/nerve/tendon/osseous injury. Prophylactic antibiotics are unwarranted at this time. Pt is counseled on local wound care. Patient advised to follow up for suture removal or otherwise return to clinic if any new signs or symptoms develop. Otherwise follow with PCP. Return to work full duty, see [employer].  Patient verbalized understanding and agree with plan.     _____________MEDCO-14 Physician's Report of Work Ability [facility]-[id number]_________________      Injured Worker:  Date of injury: Claim number:   Gomez Aponte 11/22/2024 FROI     Date of last appointment /examination: Date of this appointment /examination:  Date of next appointment /examination:    FROI   11/22/24  PRN     Employer name: Injured worker's position of employment at time of injury:   [employer]    CS Supervisor     MEDCO-14 submission   1.  Please select one of the following options: I have never completed a MEDCO-14. Proceed to section 2.         Employment/Occupation (Complete this section and proceed to section 3)  2. Updates made to Employment/Occupation Section: Yes    Have you reviewed the description of the injured worker's job held on the date of the injury (former position of employment)? Yes, job description provided by: Injured worker       Work Status/Injured worker's capabilities.   3a. Updates made to work status/injured worker's capabilities: No    Does the injured worker have any physical or health restrictions related to allowed conditions in the claim? No, the injured worker is released to work as of the date of this exam. Proceed to Section 8     3b. If restrictions, can the injured worker return to full duties of his/her job held on the date of injury (former position of employment)?     Yes. The injured worker is released to work as of the date of this exam. Proceed to Section 8.   3c. Please indicate which of the activities listed below the injured worker can perform (even if the response to 3B is No.)  "    The injured worker is not released to the former position of employment but may return to available and appropriate work with restrictions, the possible return to work date:     The injured worker can perform simple grasping with:   The injured worker can perform repetitive wrist motion with:   The injured worker's dominant hand is:   The injured worker can perform repetitive actions to operate foot controls or motor vehicles with:     If the injured worker is taking prescribed medications for the allowed conditions in this claim, can the injured worker safely:   *Operate heavy machinery:   *Drive:   *Perform other critical job tasks as defined by any source listed above in section 2:      Please indicate the following: Never, Occasionally, Frequently, Continuously    Activity   Bend:   Squat / kneel:   Twist / turn:   Climb:  Reach above shoulder:   Type / Keyboard:   Work w/cold substances:   Work w/hot substances:       Lifting/Carrying                                      Pushing/pulling  0 - 10 lbs:   11 - 20 lbs:   21 - 40 lbs:   41 - 60 lbs:   61 - 100 lbs:  0 - 25 lbs:   26 - 40 lbs:   41 -  60 lbs:   61 - 100 lbs:   100 + lbs:        How many total hours can the injured worker work?  FULL     In an eight-hour workday, how many total hours is the injured worker potentially able to work?  FULL    Sit:   Walk:   Stand:           Does the injured worker have any functional restrictions based only on allowed psychological conditions?     *Note: If Yes is indicated please reference the MEDCO-16 as needed.     Additionally, in this space please provide any additional information addressing the  injured worker's capabilities and/or job accommodations which may not be addressed above.        Disability information   4a.  *Note: If 3B above is \"No\" or dates updated - all 4A fields, including site/location if applicable must be completed    Updates:    Complete the chart below and furnish the narrative description " of the diagnosis(es), site/location, if applicable, and ICD code for the conditions being treated due to the work- related injury/disease.  Please indicate if the condition is preventing the injured worker from returning to job duties he/she held on the date of injury.       Narrative description of the work-related allowed condition Site/Location if applicable ICD Code Is the condition preventing full duty release to the job injured worker held on date of injury?       No      No      No      No      No      No        4B. List all other relevant conditions that impact treatment of the conditions listed above (e.g., co-morbidities or not yet allowed conditions).   N/A     Clinical findings:    5. You can reference office notes in lieu of writing clinical findings below.    Patient has no restrictions, my return to work full duty. Advised to follow up on an as needed basis.     The injured worker is progressing:  Return to work full duty.     Provide your clinical and objective findings supporting your medical opinion outlined on this form.  List barriers to return to work and reason, for the injured worker's delay in recovery.        Maximum medical improvement (MMI):  6. Updates:     MMI is a treatment plateau (static or well-stabilized) at which no fundamental       functional or physiological change can be expected within reasonable medical       probability, in spite of continuing medical or rehabilitative procedures.     Has the work-related injury(s) or occupational disease reached MMI based on the definition above?     *Note: An injured worker may need supportive treatment to maintain his or her level of function after reaching MMI. Thus, periodic medical treatment may still be requested and provided.      Vocational rehabilitation:   7. Updates:     Vocational rehabilitation is an individualized and voluntary program for an eligible injured worker who needs assistance in safely returning to work or in  retaining employment.  This program can be tailored around an injured worker's restrictions and may provide job seeking skills or necessary retraining. Is the injured worker a candidate for vocational rehabilitation services focusing on return to work?      Treating physician signature - mandatory:  8. I certify the information on this form is correct to the best of my knowledge. I am aware that any person who knowingly makes a false statement, misrepresentation, concealment of fact or any other act of fraud to obtain payment as provided by [facility], or who knowingly accepts payment to which that person is not entitled, is subject to felony criminal prosecution and may be punished, under appropriate criminal provisions. by a fine or imprisonment or both.     Treating physician's name (please print legibly): Nina Garcia PA-C  [facility] provider ([place]) number: [phone]     Complete Address, Telephone, Fax number and Date:   [facility]  [address] [place] [address]  [phone]    Treating physician's signature: Nina Garcia PA-C        Orders and Diagnoses  Diagnoses and all orders for this visit:  Laceration of right hand without foreign body, initial encounter  Other orders  -     Laceration Repair      Medical Admin Record      Patient disposition: Home    Electronically signed by Nina Garcia PA-C  10:55 AM

## 2024-12-03 ENCOUNTER — OFFICE VISIT (OUTPATIENT)
Dept: URGENT CARE | Age: 59
End: 2024-12-03
Payer: COMMERCIAL

## 2024-12-03 DIAGNOSIS — Z48.02 VISIT FOR SUTURE REMOVAL: Primary | ICD-10-CM

## 2024-12-03 ASSESSMENT — ENCOUNTER SYMPTOMS
FEVER: 0
CHILLS: 0
WOUND: 0

## 2024-12-03 NOTE — PROGRESS NOTES
Subjective   Patient ID: Gomez Aponte is a 59 y.o. male. They present today with a chief complaint of No chief complaint on file..    History of Present Illness  Patient presents for suture removal for sutures placed to right hand on 11/22/24. [facility] injury. Denies any swelling, pain, discharge. Denies fever, chills, sweats. Reports no complications since prior visit.           Past Medical History  Allergies as of 12/03/2024    (No Known Allergies)       (Not in a hospital admission)       Past Medical History:   Diagnosis Date    Cyst of pancreas (HHS-HCC)     Pancreatic cyst    GERD (gastroesophageal reflux disease)     Hypothyroidism     Impaired fasting glucose 06/10/2020    Abnormal fasting glucose    Impaired fasting glucose 12/10/2019    Abnormal fasting glucose    Personal history of other diseases of the circulatory system 04/16/2019    History of hypertension    Personal history of other specified conditions 04/16/2019    History of abdominal pain       Past Surgical History:   Procedure Laterality Date    OTHER SURGICAL HISTORY  04/16/2019    Carpal tunnel surgery    OTHER SURGICAL HISTORY  10/27/2022    Gallbladder surgery    OTHER SURGICAL HISTORY  07/26/2019    Whipple procedure    OTHER SURGICAL HISTORY  04/26/2019    Eye surgery        reports that he has an unknown smoking status. He has never been exposed to tobacco smoke. He has never used smokeless tobacco. He reports that he does not drink alcohol and does not use drugs.    Review of Systems  Review of Systems   Constitutional:  Negative for chills and fever.   Skin:  Negative for rash and wound.                                  Objective    There were no vitals filed for this visit.  No LMP for male patient.    Physical Exam  Skin:     Findings: No erythema or petechiae.      Comments: Linear laceration to palmar surface of right hand, approximated and closed with  5 black sutures. No redness, swelling. Non tender.          Suture  Removal    Date/Time: 12/3/2024 10:22 AM    Performed by: Nina Garcia PA-C  Authorized by: Nina Garcia PA-C    Consent:     Consent obtained:  Verbal  Location:     Location:  Upper extremity    Upper extremity location:  Hand    Hand location:  R hand  Procedure details:     Wound appearance:  No signs of infection    Number of sutures removed:  5  Post-procedure details:     Post-removal:  Band-Aid applied    Procedure completion:  Tolerated well, no immediate complications      Point of Care Test & Imaging Results from this visit  No results found for this visit on 12/03/24.   No results found.    Diagnostic study results (if any) were reviewed by [facility].    Assessment/Plan   Allergies, medications, history, and pertinent labs/EKGs/Imaging reviewed by Nina Garcia PA-C.     Medical Decision Making  MDM- Suture removal able to be completed without evidence of infection or wound dehiscence. Wound remains closed and well approximated following removal of sutures. See procedure note. Patient counseled on wound care at home and advised to return with any further concerns. Patient/family verbalized understanding and agree with plan.       Orders and Diagnoses  Diagnoses and all orders for this visit:  Visit for suture removal  Other orders  -     Suture Removal      Medical Admin Record      Patient disposition: Home    Electronically signed by [facility]  10:23 AM

## 2025-01-02 ENCOUNTER — HOSPITAL ENCOUNTER (OUTPATIENT)
Dept: RADIOLOGY | Facility: HOSPITAL | Age: 60
Discharge: HOME | End: 2025-01-02
Payer: COMMERCIAL

## 2025-01-02 DIAGNOSIS — E11.9 DIET-CONTROLLED DIABETES MELLITUS (MULTI): ICD-10-CM

## 2025-01-02 PROCEDURE — 75571 CT HRT W/O DYE W/CA TEST: CPT

## 2025-01-13 ENCOUNTER — TELEPHONE (OUTPATIENT)
Dept: PRIMARY CARE | Facility: CLINIC | Age: 60
End: 2025-01-13
Payer: COMMERCIAL

## 2025-01-13 NOTE — TELEPHONE ENCOUNTER
----- Message from Emy Bellamy sent at 1/13/2025  4:16 PM EST -----  Updated patient via Portal regarding results, please confirm that he receives the message as he has not been on since December. Would like to repeat CMP, Lipids with an OV in 3 months, visit needs scheduled. Thank you!

## 2025-01-15 NOTE — TELEPHONE ENCOUNTER
Patient called in stating that he will do the Asprin but he did the statin therapy before and he did not like the way he felt on that. He is going to change his diet and exercise more. He did sched for 5/5 he said to put his lab work in and he will go before his apt

## 2025-04-30 LAB
ALBUMIN SERPL-MCNC: 4.5 G/DL (ref 3.6–5.1)
ALP SERPL-CCNC: 61 U/L (ref 35–144)
ALT SERPL-CCNC: 26 U/L (ref 9–46)
ANION GAP SERPL CALCULATED.4IONS-SCNC: 11 MMOL/L (CALC) (ref 7–17)
AST SERPL-CCNC: 26 U/L (ref 10–35)
BILIRUB SERPL-MCNC: 0.7 MG/DL (ref 0.2–1.2)
BUN SERPL-MCNC: 23 MG/DL (ref 7–25)
CALCIUM SERPL-MCNC: 8.8 MG/DL (ref 8.6–10.3)
CHLORIDE SERPL-SCNC: 106 MMOL/L (ref 98–110)
CHOLEST SERPL-MCNC: 183 MG/DL
CHOLEST/HDLC SERPL: 3.9 (CALC)
CO2 SERPL-SCNC: 25 MMOL/L (ref 20–32)
CREAT SERPL-MCNC: 0.95 MG/DL (ref 0.7–1.3)
EGFRCR SERPLBLD CKD-EPI 2021: 93 ML/MIN/1.73M2
ERYTHROCYTE [DISTWIDTH] IN BLOOD BY AUTOMATED COUNT: 15.4 % (ref 11–15)
EST. AVERAGE GLUCOSE BLD GHB EST-MCNC: 134 MG/DL
EST. AVERAGE GLUCOSE BLD GHB EST-SCNC: 7.4 MMOL/L
GLUCOSE SERPL-MCNC: 119 MG/DL (ref 65–99)
HBA1C MFR BLD: 6.3 %
HCT VFR BLD AUTO: 43.9 % (ref 38.5–50)
HDLC SERPL-MCNC: 47 MG/DL
HGB BLD-MCNC: 13.3 G/DL (ref 13.2–17.1)
LDLC SERPL CALC-MCNC: 122 MG/DL (CALC)
MCH RBC QN AUTO: 24.1 PG (ref 27–33)
MCHC RBC AUTO-ENTMCNC: 30.3 G/DL (ref 32–36)
MCV RBC AUTO: 79.4 FL (ref 80–100)
NONHDLC SERPL-MCNC: 136 MG/DL (CALC)
PLATELET # BLD AUTO: 153 THOUSAND/UL (ref 140–400)
PMV BLD REES-ECKER: 9.7 FL (ref 7.5–12.5)
POTASSIUM SERPL-SCNC: 4.1 MMOL/L (ref 3.5–5.3)
PROT SERPL-MCNC: 6.8 G/DL (ref 6.1–8.1)
PSA SERPL-MCNC: 0.74 NG/ML
RBC # BLD AUTO: 5.53 MILLION/UL (ref 4.2–5.8)
SODIUM SERPL-SCNC: 142 MMOL/L (ref 135–146)
TRIGL SERPL-MCNC: 52 MG/DL
VIT B12 SERPL-MCNC: 413 PG/ML (ref 200–1100)
WBC # BLD AUTO: 3.9 THOUSAND/UL (ref 3.8–10.8)

## 2025-05-05 ENCOUNTER — APPOINTMENT (OUTPATIENT)
Dept: PRIMARY CARE | Facility: CLINIC | Age: 60
End: 2025-05-05
Payer: COMMERCIAL

## 2025-05-05 VITALS
HEART RATE: 68 BPM | BODY MASS INDEX: 32.78 KG/M2 | SYSTOLIC BLOOD PRESSURE: 110 MMHG | WEIGHT: 229 LBS | DIASTOLIC BLOOD PRESSURE: 80 MMHG | HEIGHT: 70 IN

## 2025-05-05 DIAGNOSIS — E03.9 ACQUIRED HYPOTHYROIDISM: ICD-10-CM

## 2025-05-05 DIAGNOSIS — E11.9 DIET-CONTROLLED DIABETES MELLITUS (MULTI): Primary | ICD-10-CM

## 2025-05-05 DIAGNOSIS — Z00.00 HEALTHCARE MAINTENANCE: ICD-10-CM

## 2025-05-05 DIAGNOSIS — D49.0 IPMN (INTRADUCTAL PAPILLARY MUCINOUS NEOPLASM): ICD-10-CM

## 2025-05-05 DIAGNOSIS — K21.00 GASTROESOPHAGEAL REFLUX DISEASE WITH ESOPHAGITIS WITHOUT HEMORRHAGE: ICD-10-CM

## 2025-05-05 PROCEDURE — 3074F SYST BP LT 130 MM HG: CPT | Performed by: CLINICAL NURSE SPECIALIST

## 2025-05-05 PROCEDURE — 3079F DIAST BP 80-89 MM HG: CPT | Performed by: CLINICAL NURSE SPECIALIST

## 2025-05-05 PROCEDURE — 3008F BODY MASS INDEX DOCD: CPT | Performed by: CLINICAL NURSE SPECIALIST

## 2025-05-05 PROCEDURE — 99396 PREV VISIT EST AGE 40-64: CPT | Performed by: CLINICAL NURSE SPECIALIST

## 2025-05-05 RX ORDER — FAMOTIDINE 20 MG/1
20 TABLET, FILM COATED ORAL DAILY
Qty: 90 TABLET | Refills: 3 | Status: SHIPPED | OUTPATIENT
Start: 2025-05-05 | End: 2026-04-30

## 2025-05-05 ASSESSMENT — ENCOUNTER SYMPTOMS
SHORTNESS OF BREATH: 0
OCCASIONAL FEELINGS OF UNSTEADINESS: 0
BLOOD IN STOOL: 0
MYALGIAS: 0
UNEXPECTED WEIGHT CHANGE: 0
LOSS OF SENSATION IN FEET: 0
DIZZINESS: 0
SORE THROAT: 0
TROUBLE SWALLOWING: 0
CHILLS: 0
WOUND: 0
PALPITATIONS: 0
SLEEP DISTURBANCE: 0
DYSURIA: 0
CONFUSION: 0
ACTIVITY CHANGE: 0
PHOTOPHOBIA: 0
DEPRESSION: 0
CHEST TIGHTNESS: 0
APPETITE CHANGE: 0
COUGH: 0
NECK PAIN: 0
HEMATURIA: 0
NAUSEA: 0
ABDOMINAL PAIN: 0
ARTHRALGIAS: 0
POLYDIPSIA: 0
CONSTIPATION: 0
DIARRHEA: 0
FLANK PAIN: 0
WHEEZING: 0
BACK PAIN: 0
VOMITING: 0
FATIGUE: 0
BRUISES/BLEEDS EASILY: 0
JOINT SWELLING: 0
EYE PAIN: 0
FEVER: 0
SEIZURES: 0
HEADACHES: 0

## 2025-05-05 ASSESSMENT — COLUMBIA-SUICIDE SEVERITY RATING SCALE - C-SSRS
2. HAVE YOU ACTUALLY HAD ANY THOUGHTS OF KILLING YOURSELF?: NO
6. HAVE YOU EVER DONE ANYTHING, STARTED TO DO ANYTHING, OR PREPARED TO DO ANYTHING TO END YOUR LIFE?: NO
1. IN THE PAST MONTH, HAVE YOU WISHED YOU WERE DEAD OR WISHED YOU COULD GO TO SLEEP AND NOT WAKE UP?: NO

## 2025-05-05 ASSESSMENT — PATIENT HEALTH QUESTIONNAIRE - PHQ9
2. FEELING DOWN, DEPRESSED OR HOPELESS: NOT AT ALL
1. LITTLE INTEREST OR PLEASURE IN DOING THINGS: NOT AT ALL
SUM OF ALL RESPONSES TO PHQ9 QUESTIONS 1 AND 2: 0

## 2025-05-05 NOTE — PROGRESS NOTES
Subjective   Patient ID: Gomez Aponte is a 59 y.o. male who presents for Annual Exam (Wellness exam).  HPI    Here today as a follow up appointment. Due for a Wellness exam. Discuss lab results.      Follows with Dr. Sewell for Endo for Hypothyroidism. Annually. Scheduled for July 2025.      Follows with Dr. Byrnes. 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 Imaging September 2024, plan to transition to every other year imaging.     Previously was on medication for his Cholesterol. States that he didn't tolerate well. [place] that his sex drive was way down on the medication. Didn't feel as clear headed. Believes that it was Atorvastatin.        Review of Systems   Constitutional:  Negative for activity change, appetite change, chills, fatigue, fever and unexpected weight change.   HENT:  Negative for ear pain, hearing loss, nosebleeds, sore throat, tinnitus and trouble swallowing.    Eyes:  Negative for photophobia, pain and visual disturbance.   Respiratory:  Negative for cough, chest tightness, shortness of breath and wheezing.    Cardiovascular:  Negative for chest pain, palpitations and leg swelling.   Gastrointestinal:  Negative for abdominal pain, blood in stool, constipation, diarrhea, nausea and vomiting.   Endocrine: Negative for cold intolerance, heat intolerance, polydipsia and polyuria.   Genitourinary:  Negative for dysuria, flank pain and hematuria.   Musculoskeletal:  Negative for arthralgias, back pain, joint swelling, myalgias and neck pain.   Skin:  Negative for pallor, rash and wound.   Allergic/Immunologic: Negative for immunocompromised state.   Neurological:  Negative for dizziness, seizures and headaches.   Hematological:  Does not bruise/bleed easily.   Psychiatric/Behavioral:  Negative for confusion and sleep disturbance.        Objective   Physical Exam  Vitals and nursing note reviewed.   Constitutional:       General: He is not in acute distress.     Appearance:  Normal appearance.   HENT:      Head: Normocephalic.      Nose: Nose normal.   Eyes:      Conjunctiva/sclera: Conjunctivae normal.   Neck:      Vascular: No carotid bruit.   Cardiovascular:      Rate and Rhythm: Normal rate and regular rhythm.      Pulses: Normal pulses.      Heart sounds: Normal heart sounds.   Pulmonary:      Effort: Pulmonary effort is normal.      Breath sounds: Normal breath sounds.   Abdominal:      General: Bowel sounds are normal.      Palpations: Abdomen is soft.   Musculoskeletal:         General: Normal range of motion.      Cervical back: Normal range of motion.   Skin:     General: Skin is warm and dry.   Neurological:      Mental Status: He is alert and oriented to person, place, and time. Mental status is at baseline.   Psychiatric:         Mood and Affect: Mood normal.         Behavior: Behavior normal.       Assessment/Plan       Reviewed results of blood work completed with patient.     Diabetes: Most recent A1C 6.3%. Diet controlled. Albumin ordered. Up to date with vision exams. Normally well controlled readings at home, around 118 per patient. CT Cardiac Scoring: Moderate risk, January 2025.    GERD: Famotidine as prescribed.   Hypothyroidism: Levothyroxine as prescribed. Continue to follow with Endo as previously determined.   IPMN: Following with Dr. Byrnes as previously determined. Planning imaging every other year.   Wellness: Routine and age appropriate recommendations discussed with the patient today and patient verbalized understanding of the recommendations.  Questions answered.  Age appropriate immunizations and preventative screenings discussed with the patient and ordered as appropriate. Labs updated and ordered as indicated. Recommend healthy diet and daily exercise to maintain healthy body weight.   Hyperlipidemia: Previously did not tolerate Atorvastatin. Would like to hold off on medication management at this time. Agreeable to lifestyle modifications for 3  months. If levels do not improve, will consider medication. Discussed Rosuvastatin and Zetia as potential options.      Wellness: May 2025.   COVID Vaccinations: June 2021.   Declined updated Vaccinations.   Colonoscopy: September 2024, plan to repeat in 5 years.   Prostate Cancer Screening: April 2025.      Emy Bellamy, APRN-CNS 05/05/25 7:36 AM

## 2025-07-19 LAB — TSH SERPL-ACNC: 3.99 MIU/L (ref 0.4–4.5)

## 2025-07-31 ENCOUNTER — APPOINTMENT (OUTPATIENT)
Dept: ENDOCRINOLOGY | Facility: CLINIC | Age: 60
End: 2025-07-31
Payer: COMMERCIAL

## 2025-07-31 VITALS
WEIGHT: 222 LBS | BODY MASS INDEX: 31.85 KG/M2 | DIASTOLIC BLOOD PRESSURE: 82 MMHG | SYSTOLIC BLOOD PRESSURE: 148 MMHG | HEART RATE: 63 BPM

## 2025-07-31 DIAGNOSIS — E03.9 HYPOTHYROIDISM, UNSPECIFIED TYPE: ICD-10-CM

## 2025-07-31 PROCEDURE — 99214 OFFICE O/P EST MOD 30 MIN: CPT | Performed by: INTERNAL MEDICINE

## 2025-07-31 RX ORDER — LEVOTHYROXINE SODIUM 112 UG/1
224 TABLET ORAL
Qty: 180 TABLET | Refills: 3 | Status: SHIPPED | OUTPATIENT
Start: 2025-07-31 | End: 2026-07-31

## 2025-07-31 ASSESSMENT — ENCOUNTER SYMPTOMS
TREMORS: 0
WEAKNESS: 0
NECK PAIN: 0
VOMITING: 0
DIARRHEA: 0
CONSTIPATION: 0
SHORTNESS OF BREATH: 0
UNEXPECTED WEIGHT CHANGE: 0
FEVER: 0
TROUBLE SWALLOWING: 0
FATIGUE: 0
ABDOMINAL PAIN: 0
HEADACHES: 0
PALPITATIONS: 0
NERVOUS/ANXIOUS: 0
NAUSEA: 0

## 2025-07-31 NOTE — PROGRESS NOTES
History Of Present Illness  oGmez Aponte is a 59 y.o. male with a 30 year history of hypothyroidism    Levothyroxine 224 mcg/day  Patient is taking levothyroxine on an empty stomach with water alone.    No neck symptoms  Intentional weight loss 20 lbs.     Past Medical History  He has a past medical history of Cyst of pancreas (HHS-HCC), GERD (gastroesophageal reflux disease), Hypothyroidism, Impaired fasting glucose (06/10/2020), Impaired fasting glucose (12/10/2019), Personal history of other diseases of the circulatory system (04/16/2019), and Personal history of other specified conditions (04/16/2019).    Surgical History  He has a past surgical history that includes Other surgical history (04/16/2019); Other surgical history (10/27/2022); Other surgical history (07/26/2019); and Other surgical history (04/26/2019).     Social History  He reports that he has an unknown smoking status. He has never been exposed to tobacco smoke. He has never used smokeless tobacco. He reports that he does not drink alcohol and does not use drugs.    Family History  Family History[1]    Medications  Current Outpatient Medications   Medication Instructions    famotidine (PEPCID) 20 mg, oral, Daily    levothyroxine (SYNTHROID, LEVOXYL) 224 mcg, oral, Daily before breakfast       Allergies  Patient has no known allergies.    Review of Systems   Constitutional:  Negative for fatigue, fever and unexpected weight change.   HENT:  Positive for tinnitus. Negative for trouble swallowing.    Eyes:  Negative for visual disturbance.   Respiratory:  Negative for shortness of breath.    Cardiovascular:  Negative for chest pain and palpitations.   Gastrointestinal:  Negative for abdominal pain, constipation, diarrhea, nausea and vomiting.   Endocrine: Negative for cold intolerance and heat intolerance.   Musculoskeletal:  Negative for neck pain.   Skin:  Negative for rash.   Neurological:  Negative for tremors, weakness and headaches.    Psychiatric/Behavioral:  The patient is not nervous/anxious.          Last Recorded Vitals  Blood pressure 148/82, pulse 63, weight 101 kg (222 lb).    Physical Exam  Constitutional:       General: He is not in acute distress.  HENT:      Head: Normocephalic.      Mouth/Throat:      Mouth: Mucous membranes are moist.     Eyes:      Comments: Lateral deviation left eye   Neck:      Thyroid: No thyroid mass.      Comments: Thyroid nonpalpable  Cardiovascular:      Pulses:           Radial pulses are 2+ on the right side and 2+ on the left side.     Musculoskeletal:      Right lower leg: No edema.      Left lower leg: No edema.   Lymphadenopathy:      Cervical: No cervical adenopathy.     Neurological:      Mental Status: He is alert.      Motor: No tremor.     Psychiatric:         Mood and Affect: Affect normal.          Relevant Results  Lab Results   Component Value Date    TSH 3.99 07/18/2025    FREET4 0.76 07/14/2022         IMPRESSION  HYPOTHYROIDISM  Euthyroid on current replacement  No goiter  Intentional weight loss      RECOMMENDATIONS  Continue levothyroxine 224 mcg/day  Take levothyroxine on an empty stomach with water alone, 1 hour before eating or taking other medications, 4 hours before any calcium or iron supplement.    Follow up 1 year  Repeat TSH before next appointment       [1]   Family History  Problem Relation Name Age of Onset    Liver cancer Mother      Stomach cancer Father

## 2025-07-31 NOTE — LETTER
July 31, 2025     Emy Bellamy, [facility]  [address]  [facility] [place] [address]    Patient: Gomez Aponte   YOB: 1965   Date of Visit: 7/31/2025       Dear Dr. Emy Bellamy, APRN-CNS:    Thank you for referring Gomez Aponte to me for evaluation. Below are my notes for this consultation.  If you have questions, please do not hesitate to call me. I look forward to following your patient along with you.       Sincerely,     Gucci Sewell MD      CC: No Recipients  ______________________________________________________________________________________    History Of Present Illness  Gomez Aponte is a 59 y.o. male with a 30 year history of hypothyroidism    Levothyroxine 224 mcg/day  Patient is taking levothyroxine on an empty stomach with water alone.    No neck symptoms  Intentional weight loss 20 lbs.     Past Medical History  He has a past medical history of Cyst of pancreas ([facility]), GERD (gastroesophageal reflux disease), Hypothyroidism, Impaired fasting glucose (06/10/2020), Impaired fasting glucose (12/10/2019), Personal history of other diseases of the circulatory system (04/16/2019), and Personal history of other specified conditions (04/16/2019).    Surgical History  He has a past surgical history that includes Other surgical history (04/16/2019); Other surgical history (10/27/2022); Other surgical history (07/26/2019); and Other surgical history (04/26/2019).     Social History  He reports that he has an unknown smoking status. He has never been exposed to tobacco smoke. He has never used smokeless tobacco. He reports that he does not drink alcohol and does not use drugs.    Family History  Family History[1]    Medications  Current Outpatient Medications   Medication Instructions   • famotidine (PEPCID) 20 mg, oral, Daily   • levothyroxine (SYNTHROID, LEVOXYL) 224 mcg, oral, Daily before breakfast       Allergies  Patient has no known  allergies.    Review of Systems   Constitutional:  Negative for fatigue, fever and unexpected weight change.   HENT:  Positive for tinnitus. Negative for trouble swallowing.    Eyes:  Negative for visual disturbance.   Respiratory:  Negative for shortness of breath.    Cardiovascular:  Negative for chest pain and palpitations.   Gastrointestinal:  Negative for abdominal pain, constipation, diarrhea, nausea and vomiting.   Endocrine: Negative for cold intolerance and heat intolerance.   Musculoskeletal:  Negative for neck pain.   Skin:  Negative for rash.   Neurological:  Negative for tremors, weakness and headaches.   Psychiatric/Behavioral:  The patient is not nervous/anxious.          Last Recorded Vitals  Blood pressure 148/82, pulse 63, weight 101 kg (222 lb).    Physical Exam  Constitutional:       General: He is not in acute distress.  HENT:      Head: Normocephalic.      Mouth/Throat:      Mouth: Mucous membranes are moist.     Eyes:      Comments: Lateral deviation left eye   Neck:      Thyroid: No thyroid mass.      Comments: Thyroid nonpalpable  Cardiovascular:      Pulses:           Radial pulses are 2+ on the right side and 2+ on the left side.     Musculoskeletal:      Right lower leg: No edema.      Left lower leg: No edema.   Lymphadenopathy:      Cervical: No cervical adenopathy.     Neurological:      Mental Status: He is alert.      Motor: No tremor.     Psychiatric:         Mood and Affect: Affect normal.          Relevant Results  Lab Results   Component Value Date    TSH 3.99 07/18/2025    FREET4 0.76 07/14/2022         IMPRESSION  HYPOTHYROIDISM  Euthyroid on current replacement  No goiter  Intentional weight loss      RECOMMENDATIONS  Continue levothyroxine 224 mcg/day  Take levothyroxine on an empty stomach with water alone, 1 hour before eating or taking other medications, 4 hours before any calcium or iron supplement.    Follow up 1 year  Repeat TSH before next appointment       [1]  Family  History  Problem Relation Name Age of Onset   • Liver cancer Mother     • Stomach cancer Father          [1]  Family History  Problem Relation Name Age of Onset   • Liver cancer Mother     • Stomach cancer Father

## 2025-08-05 DIAGNOSIS — D49.0 IPMN (INTRADUCTAL PAPILLARY MUCINOUS NEOPLASM): ICD-10-CM

## 2025-08-05 DIAGNOSIS — E03.9 ACQUIRED HYPOTHYROIDISM: ICD-10-CM

## 2025-08-05 DIAGNOSIS — E11.9 DIET-CONTROLLED DIABETES MELLITUS (MULTI): ICD-10-CM

## 2025-08-06 LAB
ALBUMIN SERPL-MCNC: 4.4 G/DL (ref 3.6–5.1)
ALP SERPL-CCNC: 59 U/L (ref 35–144)
ALT SERPL-CCNC: 15 U/L (ref 9–46)
ANION GAP SERPL CALCULATED.4IONS-SCNC: 10 MMOL/L (CALC) (ref 7–17)
AST SERPL-CCNC: 18 U/L (ref 10–35)
BILIRUB SERPL-MCNC: 0.4 MG/DL (ref 0.2–1.2)
BUN SERPL-MCNC: 26 MG/DL (ref 7–25)
CALCIUM SERPL-MCNC: 8.4 MG/DL (ref 8.6–10.3)
CHLORIDE SERPL-SCNC: 107 MMOL/L (ref 98–110)
CHOLEST SERPL-MCNC: 188 MG/DL
CHOLEST/HDLC SERPL: 4 (CALC)
CO2 SERPL-SCNC: 24 MMOL/L (ref 20–32)
CREAT SERPL-MCNC: 0.95 MG/DL (ref 0.7–1.3)
EGFRCR SERPLBLD CKD-EPI 2021: 93 ML/MIN/1.73M2
GLUCOSE SERPL-MCNC: 124 MG/DL (ref 65–99)
HDLC SERPL-MCNC: 47 MG/DL
LDLC SERPL CALC-MCNC: 127 MG/DL (CALC)
NONHDLC SERPL-MCNC: 141 MG/DL (CALC)
POTASSIUM SERPL-SCNC: 4.2 MMOL/L (ref 3.5–5.3)
PROT SERPL-MCNC: 6.5 G/DL (ref 6.1–8.1)
SODIUM SERPL-SCNC: 141 MMOL/L (ref 135–146)
TRIGL SERPL-MCNC: 51 MG/DL

## 2025-08-11 ENCOUNTER — APPOINTMENT (OUTPATIENT)
Dept: PRIMARY CARE | Facility: CLINIC | Age: 60
End: 2025-08-11
Payer: COMMERCIAL

## 2025-08-11 VITALS
BODY MASS INDEX: 31.07 KG/M2 | WEIGHT: 217 LBS | HEIGHT: 70 IN | SYSTOLIC BLOOD PRESSURE: 130 MMHG | HEART RATE: 63 BPM | DIASTOLIC BLOOD PRESSURE: 80 MMHG

## 2025-08-11 DIAGNOSIS — E03.9 ACQUIRED HYPOTHYROIDISM: ICD-10-CM

## 2025-08-11 DIAGNOSIS — I83.93 VARICOSE VEINS OF BOTH LOWER EXTREMITIES, UNSPECIFIED WHETHER COMPLICATED: ICD-10-CM

## 2025-08-11 DIAGNOSIS — E11.9 DIET-CONTROLLED DIABETES MELLITUS (MULTI): ICD-10-CM

## 2025-08-11 DIAGNOSIS — M25.531 RIGHT WRIST PAIN: Primary | ICD-10-CM

## 2025-08-11 DIAGNOSIS — K21.00 GASTROESOPHAGEAL REFLUX DISEASE WITH ESOPHAGITIS WITHOUT HEMORRHAGE: ICD-10-CM

## 2025-08-11 DIAGNOSIS — D49.0 IPMN (INTRADUCTAL PAPILLARY MUCINOUS NEOPLASM): ICD-10-CM

## 2025-08-11 DIAGNOSIS — R19.00 ABDOMINAL WALL BULGE: ICD-10-CM

## 2025-08-11 PROCEDURE — 3075F SYST BP GE 130 - 139MM HG: CPT | Performed by: CLINICAL NURSE SPECIALIST

## 2025-08-11 PROCEDURE — 3079F DIAST BP 80-89 MM HG: CPT | Performed by: CLINICAL NURSE SPECIALIST

## 2025-08-11 PROCEDURE — 99214 OFFICE O/P EST MOD 30 MIN: CPT | Performed by: CLINICAL NURSE SPECIALIST

## 2025-08-11 PROCEDURE — 3008F BODY MASS INDEX DOCD: CPT | Performed by: CLINICAL NURSE SPECIALIST

## 2025-08-11 RX ORDER — NAPROXEN 500 MG/1
500 TABLET ORAL
Qty: 60 TABLET | Refills: 0 | Status: SHIPPED | OUTPATIENT
Start: 2025-08-11 | End: 2025-09-10

## 2025-08-11 ASSESSMENT — ENCOUNTER SYMPTOMS
CONFUSION: 0
COUGH: 0
WHEEZING: 0
CONSTIPATION: 0
FEVER: 0
HEMATURIA: 0
LOSS OF SENSATION IN FEET: 0
UNEXPECTED WEIGHT CHANGE: 0
EYE PAIN: 0
JOINT SWELLING: 0
BRUISES/BLEEDS EASILY: 0
PALPITATIONS: 0
SHORTNESS OF BREATH: 0
NECK PAIN: 0
SORE THROAT: 0
SLEEP DISTURBANCE: 0
HEADACHES: 0
SEIZURES: 0
CHEST TIGHTNESS: 0
VOMITING: 0
ABDOMINAL PAIN: 0
OCCASIONAL FEELINGS OF UNSTEADINESS: 0
DYSURIA: 0
DIARRHEA: 0
BLOOD IN STOOL: 0
DIZZINESS: 0
MYALGIAS: 0
FATIGUE: 0
WOUND: 0
POLYDIPSIA: 0
ARTHRALGIAS: 1
FLANK PAIN: 0
NAUSEA: 0
PHOTOPHOBIA: 0
ACTIVITY CHANGE: 0
CHILLS: 0
TROUBLE SWALLOWING: 0
BACK PAIN: 0
APPETITE CHANGE: 0

## 2025-08-11 ASSESSMENT — PATIENT HEALTH QUESTIONNAIRE - PHQ9
1. LITTLE INTEREST OR PLEASURE IN DOING THINGS: NOT AT ALL
SUM OF ALL RESPONSES TO PHQ9 QUESTIONS 1 AND 2: 0
2. FEELING DOWN, DEPRESSED OR HOPELESS: NOT AT ALL

## 2025-08-14 ENCOUNTER — TELEPHONE (OUTPATIENT)
Dept: PRIMARY CARE | Facility: CLINIC | Age: 60
End: 2025-08-14
Payer: COMMERCIAL

## 2025-08-21 ENCOUNTER — APPOINTMENT (OUTPATIENT)
Dept: PRIMARY CARE | Facility: CLINIC | Age: 60
End: 2025-08-21
Payer: COMMERCIAL

## 2025-08-29 ENCOUNTER — HOSPITAL ENCOUNTER (OUTPATIENT)
Dept: RADIOLOGY | Facility: HOSPITAL | Age: 60
Discharge: HOME | End: 2025-08-29
Payer: COMMERCIAL

## 2025-08-29 ENCOUNTER — TELEPHONE (OUTPATIENT)
Dept: PRIMARY CARE | Facility: CLINIC | Age: 60
End: 2025-08-29
Payer: COMMERCIAL

## 2025-08-29 DIAGNOSIS — R19.00 ABDOMINAL WALL BULGE: ICD-10-CM

## 2025-08-29 PROCEDURE — 74177 CT ABD & PELVIS W/CONTRAST: CPT

## 2025-08-29 PROCEDURE — 2550000001 HC RX 255 CONTRASTS: Performed by: CLINICAL NURSE SPECIALIST

## 2025-08-29 RX ADMIN — IOHEXOL 75 ML: 350 INJECTION, SOLUTION INTRAVENOUS at 08:47

## 2025-09-03 DIAGNOSIS — K42.9 UMBILICAL HERNIA WITHOUT OBSTRUCTION AND WITHOUT GANGRENE: ICD-10-CM

## 2025-09-03 DIAGNOSIS — N40.0 ENLARGED PROSTATE: ICD-10-CM

## 2025-09-12 ENCOUNTER — APPOINTMENT (OUTPATIENT)
Dept: SURGERY | Facility: CLINIC | Age: 60
End: 2025-09-12
Payer: COMMERCIAL

## 2025-09-19 ENCOUNTER — APPOINTMENT (OUTPATIENT)
Dept: UROLOGY | Facility: CLINIC | Age: 60
End: 2025-09-19
Payer: COMMERCIAL

## 2026-03-13 ENCOUNTER — APPOINTMENT (OUTPATIENT)
Dept: PRIMARY CARE | Facility: CLINIC | Age: 61
End: 2026-03-13
Payer: COMMERCIAL

## 2026-07-30 ENCOUNTER — APPOINTMENT (OUTPATIENT)
Dept: ENDOCRINOLOGY | Facility: CLINIC | Age: 61
End: 2026-07-30
Payer: COMMERCIAL